# Patient Record
Sex: FEMALE | Race: BLACK OR AFRICAN AMERICAN
[De-identification: names, ages, dates, MRNs, and addresses within clinical notes are randomized per-mention and may not be internally consistent; named-entity substitution may affect disease eponyms.]

---

## 2018-11-08 ENCOUNTER — HOSPITAL ENCOUNTER (EMERGENCY)
Dept: HOSPITAL 58 - ED | Age: 43
Discharge: HOME | End: 2018-11-08

## 2018-11-08 VITALS — SYSTOLIC BLOOD PRESSURE: 175 MMHG | TEMPERATURE: 97.1 F | DIASTOLIC BLOOD PRESSURE: 80 MMHG

## 2018-11-08 VITALS — BODY MASS INDEX: 39.8 KG/M2

## 2018-11-08 DIAGNOSIS — I10: ICD-10-CM

## 2018-11-08 DIAGNOSIS — M54.9: ICD-10-CM

## 2018-11-08 DIAGNOSIS — E11.9: ICD-10-CM

## 2018-11-08 DIAGNOSIS — J18.1: Primary | ICD-10-CM

## 2018-11-08 DIAGNOSIS — F17.210: ICD-10-CM

## 2018-11-08 DIAGNOSIS — R10.12: ICD-10-CM

## 2018-11-08 DIAGNOSIS — N94.89: ICD-10-CM

## 2018-11-08 LAB — HCG UR QL: NEGATIVE

## 2018-11-08 PROCEDURE — 99284 EMERGENCY DEPT VISIT MOD MDM: CPT

## 2018-11-08 PROCEDURE — 87086 URINE CULTURE/COLONY COUNT: CPT

## 2018-11-08 PROCEDURE — 96375 TX/PRO/DX INJ NEW DRUG ADDON: CPT

## 2018-11-08 PROCEDURE — 81001 URINALYSIS AUTO W/SCOPE: CPT

## 2018-11-08 PROCEDURE — 96361 HYDRATE IV INFUSION ADD-ON: CPT

## 2018-11-08 PROCEDURE — 36415 COLL VENOUS BLD VENIPUNCTURE: CPT

## 2018-11-08 PROCEDURE — 81025 URINE PREGNANCY TEST: CPT

## 2018-11-08 PROCEDURE — 85730 THROMBOPLASTIN TIME PARTIAL: CPT

## 2018-11-08 PROCEDURE — 83735 ASSAY OF MAGNESIUM: CPT

## 2018-11-08 PROCEDURE — 80053 COMPREHEN METABOLIC PANEL: CPT

## 2018-11-08 PROCEDURE — 83690 ASSAY OF LIPASE: CPT

## 2018-11-08 PROCEDURE — 85610 PROTHROMBIN TIME: CPT

## 2018-11-08 PROCEDURE — 96365 THER/PROPH/DIAG IV INF INIT: CPT

## 2018-11-08 PROCEDURE — 96372 THER/PROPH/DIAG INJ SC/IM: CPT

## 2018-11-08 PROCEDURE — 85025 COMPLETE CBC W/AUTO DIFF WBC: CPT

## 2018-11-08 PROCEDURE — 82150 ASSAY OF AMYLASE: CPT

## 2018-11-08 NOTE — DI
EXAM:  Chest two views 

  

HISTORY:  Left upper quadrant abdominal pain 

  

COMPARISON:  None 

  

TECHNIQUE:  Two views of the chest were performed 

  

FINDINGS:  Right middle lobe consolidation.  There is no pleural effusion or pneumothorax.  The heart
 is normal in size.  The mediastinal contour is normal.  There are no acute abnormalities of the bone
s. 

  

IMPRESSION:   Right middle lobe consolidation, suspicious for pneumonia.  Recommend radiographic foll
ow-up to resolution.

## 2018-11-08 NOTE — ED.PDOC
General


ED Provider: 


Dr. RENUKA BLAKE





Chief Complaint: Abdominal Pain


Stated Complaint: Severe Pain in Lt Flank and LUQ of Abdomen. Onset X 2 day.  8/

10 level.  No associated N-V-D.  Currently on menses


Time Seen by Physician: 10:20


Mode of Arrival: Walk-In


Information Source: Patient


Exam Limitations: No limitations


Nursing and Triage Documentation Reviewed and Agree: Yes


Does patient meet sepsis criteria?: No


System Inflammatory Response Syndrome: Not Applicable


Sepsis Protocol: 


For patient's 13 years and over:





Temp is 96.8 and below  and greater


Pulse >90 BPM


Resp >20/minute


Acutely Altered Mental Status





Are patient's symptoms suggestive of a new infection, such as:


   -Pneumonia


   -Skin, Soft Tissue


   -Endocarditis


   -UTI


   -Bone, Joint Infection


   -Implantable Device


   -Acute Abdominal Infection


   -Wound Infection


   -Meningitis


   -Blood Stream Catheter Infection


   -Unknown








GI Complaint Exam





- Abdominal Pain Complaint/Exam


Onset: Sudden


Duration: 2 days


Symptoms Are: Still present


Timing: Constant


Initial Severity: Moderate


Current Severity: Severe


Location of Pain: LUQ (and Lt Flank)


Radiates To: Reports: Flank


Character: Reports: Sharp, Throbbing, Cramping, Tearing, Colicky


Aggravating: Reports: Deep breaths


Alleviating: Reports: None


Associated Signs and Symptoms: Reports: Back pain.  Denies: Diaphoresis, Fever, 

Cough, Chest pain, Dizziness, Constipation, Blood in stool, Dysuria, Urinary 

frequency, Decreased urine output, Decreased appetite, Vaginal bleeding, 

Vaginal discharge, Nausea, Vomiting, Diarrhea, Sore throat, Decreased activity





Review of Systems





- Review Of Systems


Constitutional: Reports: No symptoms


Eyes: Reports: No symptoms


Ears, Nose, Mouth, Throat: Reports: No symptoms


Respiratory: Reports: No symptoms


Cardiac: Reports: No symptoms


GI: Reports: No symptoms


: Reports: No symptoms


Musculoskeletal: Reports: No symptoms


Skin: Reports: No symptoms


Neurological: Reports: No symptoms


Endocrine: Reports: No symptoms


Hematologic/Lymphatic: Reports: No symptoms


All Other Systems: Reviewed and Negative





Past Medical History





- Past Medical History


Previously Healthy: Yes


Endocrine: Reports: None


Cardiovascular: Reports: Hypertension


Respiratory: Reports: COPD


Hematological: Reports: None


Gastrointestinal: Reports: None


Genitourinary: Reports: None


Neuro/Psych: Reports: None


Musculoskeletal: Reports: None


Cancer: Reports: None


Last Menstrual Period: now





- Surgical History


General Surgical History: Reports: None





- Family History


Family History: Reports: None





- Social History


Smoking Status: Current every day smoker


Hx Substance Use: Yes


Alcohol Screening: None





Physical Exam





- Physical Exam


Appearance: Ill-appearing, Well-nourished, Obese


Ill-appearing: Mild


Pain Distress: Severe


Eyes: SANTO, EOMI, Conjunctiva clear


ENT: Ears normal, Nose normal, Oropharynx normal


Respiratory: Airway patent, Breath sounds clear, Breath sounds equal, 

Respirations nonlabored


Cardiovascular: RRR, Pulses normal, No rub, No murmur


GI/: Soft, No masses, No Organomegaly, Tender (Diffuse Rebound tenderness and 

involuntary guarding), Bowel sounds hypoactive


Musculoskeletal: Normal strength, ROM intact, No edema, No calf tenderness


Skin: Warm, Dry, Normal color


Neurological: Sensation intact, Motor intact, Reflexes intact, Cranial nerves 

intact, Alert, Oriented


Psychiatric: Affect appropriate, Mood appropriate





Critical Care Note





- Critical Care Note


Total Time (mins): 0





Course





- Course


Hematology/Chemistry: 


 11/08/18 11:01 11/08/18 11:01


Orders, Labs, Meds: 


Lab Review











  11/08/18 11/08/18 11/08/18





  11:01 11:01 11:01


 


WBC  7.67  


 


RBC  4.83  


 


Hgb  13.7  


 


Hct  40.3  


 


MCV  83.4  


 


MCH  28.4  


 


MCHC  34.0  


 


RDW Coeff of [place]  13.4  


 


Plt Count  268  


 


Immature Gran % (Auto)  0.3  


 


Neut % (Auto)  42.2  


 


Lymph % (Auto)  46.0  


 


Mono % (Auto)  9.5  


 


Eos % (Auto)  1.6  


 


Baso % (Auto)  0.4  


 


Immature Gran # (Auto)  0.0  


 


Neut # (Auto)  3.2  


 


Lymph # (Auto)  3.5 H  


 


Mono # (Auto)  0.7  


 


Eos # (Auto)  0.1  


 


Baso # (Auto)  0.0  


 


PT    9.8


 


INR    0.98


 


APTT    23.9


 


Sodium   135.4 L 


 


Potassium   3.92 


 


Chloride   99.5 


 


Carbon Dioxide   30.2 H 


 


Anion Gap   9.62 


 


BUN   8.9 


 


Creatinine   0.53 L 


 


Estimated GFR (MDRD)   151.00 


 


BUN/Creatinine Ratio   16.79 


 


Glucose   371.8 H 


 


Calcium   9.39 


 


Magnesium   1.37 L 


 


Total Bilirubin   0.39 


 


AST   58.9 H 


 


ALT   28.7 


 


Alkaline Phosphatase   90.9 


 


Total Protein   8.44 H 


 


Albumin   4.21 


 


Globulin   4.23 


 


Albumin/Globulin Ratio   0.99 


 


Amylase   72.6 


 


Lipase   90.7 


 


Urine Color   


 


Urine Clarity   


 


Urine pH   


 


Ur Specific Gravity   


 


Urine Protein   


 


Urine Glucose (UA)   


 


Urine Ketones   


 


Urine Blood   


 


Urine Nitrite   


 


Urine Bilirubin   


 


Urine Urobilinogen   


 


Ur Leukocyte Esterase   


 


Urine Microscopic RBC   


 


Urine Microscopic WBC   


 


Ur Squamous Epith Cells   


 


Urine Bacteria   


 


Urine Pregnancy Test   














  11/08/18 11/08/18





  11:01 11:01


 


WBC  


 


RBC  


 


Hgb  


 


Hct  


 


MCV  


 


MCH  


 


MCHC  


 


RDW Coeff of [place]  


 


Plt Count  


 


Immature Gran % (Auto)  


 


Neut % (Auto)  


 


Lymph % (Auto)  


 


Mono % (Auto)  


 


Eos % (Auto)  


 


Baso % (Auto)  


 


Immature Gran # (Auto)  


 


Neut # (Auto)  


 


Lymph # (Auto)  


 


Mono # (Auto)  


 


Eos # (Auto)  


 


Baso # (Auto)  


 


PT  


 


INR  


 


APTT  


 


Sodium  


 


Potassium  


 


Chloride  


 


Carbon Dioxide  


 


Anion Gap  


 


BUN  


 


Creatinine  


 


Estimated GFR (MDRD)  


 


BUN/Creatinine Ratio  


 


Glucose  


 


Calcium  


 


Magnesium  


 


Total Bilirubin  


 


AST  


 


ALT  


 


Alkaline Phosphatase  


 


Total Protein  


 


Albumin  


 


Globulin  


 


Albumin/Globulin Ratio  


 


Amylase  


 


Lipase  


 


Urine Color  Red 


 


Urine Clarity  Cloudy 


 


Urine pH  5.5 


 


Ur Specific Gravity  1.020 


 


Urine Protein  2+ 


 


Urine Glucose (UA)  3+ H 


 


Urine Ketones  Trace 


 


Urine Blood  3+ 


 


Urine Nitrite  Negative 


 


Urine Bilirubin  1+ 


 


Urine Urobilinogen  1.0 


 


Ur Leukocyte Esterase  Negative 


 


Urine Microscopic RBC  Tntc 


 


Urine Microscopic WBC  2-5 


 


Ur Squamous Epith Cells  2-5 


 


Urine Bacteria  Trace 


 


Urine Pregnancy Test   Negative








Orders











 Category Date Time Status


 


 BLOOD GLUCOSE MONITORING Q1HR CARE  11/08/18 12:28 Active


 


 VITAL SIGNS Q30MIN CARE  11/08/18 10:38 Active


 


 IV [ED IV/MEDIPORT/POWERPORT] .ONCE EMERGENCY  11/08/18 10:38 Active


 


 AMYLASE Stat LAB  11/08/18 11:01 Completed


 


 CBC W/ AUTO DIFF Stat LAB  11/08/18 11:01 Completed


 


 CMP [COMPREHENSIVE METABOLIC PANEL] Stat LAB  11/08/18 11:01 Completed


 


 LIPASE Stat LAB  11/08/18 11:01 Completed


 


 MAGNESIUM Stat LAB  11/08/18 11:01 Completed


 


 PARTIAL THROMBOPLASTIN TIME Stat LAB  11/08/18 11:01 Completed


 


 PT WITH INR Stat LAB  11/08/18 11:01 Completed


 


 UA [URINALYSIS C & S IF INDICATED] Stat LAB  11/08/18 11:01 Completed


 


 URINE CULTURE Stat LAB  11/08/18 11:01 Completed


 


 URINE PREGNANCY Stat LAB  11/08/18 11:01 Completed


 


 0.9 % Sodium Chloride [Saline Flush] MEDS  11/08/18 10:40 Discontinued





 1 syr IVF PRN PRN   


 


 Ceftriaxone Sodium [Rocephin] MEDS  11/08/18 12:44 Discontinued





 1 gm .ROUTE .STK-MED ONE   


 


 Ceftriaxone Sodium [Rocephin] 1 gm MEDS  11/08/18 12:27 Discontinued





 0.9 % Sodium Chloride [Sodium Chloride] 50 ml   





 IV ONCE   


 


 Insulin Regular, Human [Humulin R] MEDS  11/08/18 12:27 Discontinued





 12 unit SUBCUT ONCE STA   


 


 Ketorolac Tromethamine [Toradol] MEDS  11/08/18 10:41 Discontinued





 30 mg IVP ONCE STA   


 


 Ondansetron HCl/Pf [Zofran 4 mg/2 ml] MEDS  11/08/18 10:41 Discontinued





 4 mg IVP ONCE STA   


 


 Sodium Chloride 0.9% [Sodium Chloride] 1,000 ml MEDS  11/08/18 10:41 

Discontinued





 IV BOLUS   


 


 CHEST, 2 VIEWS PA & LAT Stat RADS  11/08/18 10:39 Completed


 


 CT ABDOMEN/PELVIS WO CONTRAST Stat RADS  11/08/18 10:39 Completed


 


 ULTRASOUND PELVIS ROGERS VAGINAL/NONOB [U/S PELVIS ROGERS RADS  11/08/18 12:41 

Completed





 VAGINAL/NON OB] Stat   








Medications














Discontinued Medications














Generic Name Dose Route Start Last Admin





  Trade Name Freq  PRN Reason Stop Dose Admin


 


Sodium Chloride  1,000 mls @ 500 mls/hr  11/08/18 10:41  11/08/18 11:14





  Sodium Chloride  IV  11/08/18 12:40  500 mls/hr





  BOLUS STA   Administration





     





     





     





     


 


Ceftriaxone Sodium 1 gm/  50 mls @ 75 mls/hr  11/08/18 12:27  11/08/18 13:15





  Sodium Chloride  IV  11/08/18 13:06  75 mls/hr





  ONCE STA   Administration





     





     





     





     


 


Insulin Human Regular  12 unit  11/08/18 12:27  11/08/18 13:13





  Humulin R  SUBCUT  11/08/18 12:28  12 unit





  ONCE STA   Administration





     





     





     





     


 


Ketorolac Tromethamine  30 mg  11/08/18 10:41  11/08/18 11:12





  Toradol  IVP  11/08/18 10:42  30 mg





  ONCE STA   Administration





     





     





     





     


 


Ondansetron HCl  4 mg  11/08/18 10:41  11/08/18 11:10





  Zofran 4 Mg/2 Ml  IVP  11/08/18 10:42  4 mg





  ONCE STA   Administration





     





     





     





     


 


Sodium Chloride  1 syr  11/08/18 10:40  11/08/18 11:15





  Saline Flush  IVF   1 syr





  PRN PRN   Administration





  To flush IV   





     





     





     











Vital Signs: 


 











  Temp Pulse Resp BP Pulse Ox


 


 11/08/18 10:11  97.1 F L  94 H  20  175/80 H  96














Departure





- Departure


Time of Disposition: 13:45


Disposition: HOME SELF-CARE


Discharge Problem: 


 Abdominal pain, RML pneumonia, New onset type 2 diabetes mellitus, Pelvic cyst





Instructions:  Type 2 Diabetes in Adults: New Diagnosis (ED), Pelvic Pain in 

Women (ED), Pneumonia (ED)


Condition: Good


Pt referred to PMD for follow-up: Yes (Select PCP and follow up 5 days)


IPMP verified?: No


Additional Instructions: 


Take meds


Begin Diabetic diet-low carbohydrate


Seek care from PCP to follow up on Pneumonia, DIabetes, and Complex Pelvic Cyst


Follow up ER earlier as needed


Prescriptions: 


Cefdinir 300 mg PO BID #20 capsule


Metformin HCl 500 mg PO BID #30 tablet


Allergies/Adverse Reactions: 


Allergies





No Known Allergies Allergy (Unverified 11/08/18 10:17)


 








Home Medications: 


Ambulatory Orders





Cefdinir 300 mg PO BID #20 capsule 11/08/18 


Ipratropium/Albuterol Sulfate [Combivent Respimat Inhal Senath] 1 spray IH BID 11 /08/18 


Lisinopril/Hydrochlorothiazide [Lisinopril-Hctz 20-25 mg Tab] 1 each PO BID 11/ 08/18 


Metformin HCl 500 mg PO BID #30 tablet 11/08/18 








Disposition Discussed With: Patient

## 2018-11-08 NOTE — US
EXAM:  Transvaginal pelvic ultrasound. 

  

History:  Pelvic pain, abnormal CT 

  

Comparison:  CT abdomen pelvis 11/08/2018 

  

Technique:  Multiple sonographic images through the pelvis were obtained.  Color duplex Doppler was u
sed to interrogate vascular flow. 

  

Findings: 

  

The uterus measures 9.8 cm x 4.8 cm x 6.0 cm.  Endometrium measures 0.7 cm in thickness.  There are m
ultiple ill-defined lesions within the uterine myometrium with the largest measuring 2.8 cm.  Difficu
lt to evaluate the ovaries due to obscuration by bowel gas.  4.2 cm x 2.5 cm x 3.0 cm mildly complica
stacey or complex cyst within the left adnexa. 

  

No pelvic fluid. 

  

Impression: 

1. Complicated/complex cystic lesion within the left adnexa.  Recommend followup pelvic ultrasound in
 6 - 10 weeks off cycle to document resolution.  If this does not resolve upon follow-up then recomme
nd further evaluation with dedicated female pelvic MRI. 

2.  Probable uterine fibroids.  These can also be evaluated on follow-up.

## 2018-11-08 NOTE — CT
EXAM:  CT of the abdomen pelvis without contrast 

  

History:  Left lower quadrant abdominal pain. 

  

Comparison:  None available. 

  

Technique:  Multiplanar CT images through the abdomen pelvis were obtained without the administration
 of IV contrast 

  

Findings:  Partially visualized consolidation within the right middle lobe.  No acute osseous abnorma
lities. 

  

Cholelithiasis.  The liver is enlarged measuring 21 cm in length.  Spleen is unremarkable.  No renal 
stones and no hydronephrosis.  No peripancreatic inflammation.  Adrenal glands are unremarkable.  Fat
-containing ventral hernia measuring 5.4 cm.  The neck of the hernia measures 1.8 cm. Status post jamari
endectomy.  No bowel obstruction.  4.4 cm cystic pelvic mass.  No perirectal inflammation.  Bladder i
s not well distended.  No free air and no ascites.  No inflammatory stranding. 

  

Impression: 

1.  Indeterminate cystic pelvic mass.  Recommend further evaluation with pelvic ultrasound. 

2.  Cholelithiasis. 

3.  Hepatomegaly. 

4.  Fat-containing ventral hernia. 

5.  Right middle lobe consolidation suspicious for pneumonia but only partially visualized.  Follow-u
p recommended.

## 2018-11-19 ENCOUNTER — HOSPITAL ENCOUNTER (OUTPATIENT)
Dept: HOSPITAL 58 - FCC-LAB | Age: 43
Discharge: HOME | End: 2018-11-19
Attending: FAMILY MEDICINE

## 2018-11-19 VITALS — BODY MASS INDEX: 39.8 KG/M2

## 2018-11-19 DIAGNOSIS — I10: ICD-10-CM

## 2018-11-19 DIAGNOSIS — R73.9: Primary | ICD-10-CM

## 2018-11-19 PROCEDURE — 36415 COLL VENOUS BLD VENIPUNCTURE: CPT

## 2018-11-19 PROCEDURE — 80061 LIPID PANEL: CPT

## 2018-11-19 PROCEDURE — 83037 HB GLYCOSYLATED A1C HOME DEV: CPT

## 2019-02-19 ENCOUNTER — HOSPITAL ENCOUNTER (OUTPATIENT)
Dept: HOSPITAL 58 - RHC-LAB | Age: 44
Discharge: HOME | End: 2019-02-19
Attending: FAMILY MEDICINE

## 2019-02-19 VITALS — BODY MASS INDEX: 39.8 KG/M2

## 2019-02-19 DIAGNOSIS — I10: ICD-10-CM

## 2019-02-19 DIAGNOSIS — R73.9: Primary | ICD-10-CM

## 2019-02-19 PROCEDURE — 83037 HB GLYCOSYLATED A1C HOME DEV: CPT

## 2019-02-19 PROCEDURE — 36415 COLL VENOUS BLD VENIPUNCTURE: CPT
